# Patient Record
Sex: FEMALE | Race: WHITE
[De-identification: names, ages, dates, MRNs, and addresses within clinical notes are randomized per-mention and may not be internally consistent; named-entity substitution may affect disease eponyms.]

---

## 2019-03-25 ENCOUNTER — HOSPITAL ENCOUNTER (EMERGENCY)
Dept: HOSPITAL 88 - FSED | Age: 24
Discharge: LEFT BEFORE DISCHARGE | End: 2019-03-25
Payer: SELF-PAY

## 2019-03-25 DIAGNOSIS — R11.10: Primary | ICD-10-CM

## 2019-03-25 NOTE — XMS REPORT
Encounter Summary

                             Created on: 2017



Marisela Guerra

External Reference #: [id number]

: 1995

Sex: Female



Demographics







                          Address                   [address] [place] [address] [place]  [address]

 

                          Home Phone                [phone]

 

                          Preferred Language        Unknown

 

                          Marital Status            Never 

 

                          Jewish Affiliation     Unknown

 

                          Race                      Unknown

 

                          Ethnic Group              Unknown





Author







                          Organization              Unknown

 

                          Address                   [address] [place] [address]  [id number]



 

                          Phone                     [phone]







Care Team Providers







                    Care Team Member Name    Role                Phone

 

                    Misael Holder      3                   [phone]



                                                      



Reason for Visit

                      





                                        Medical Complaint                



                                                                              



Instructions

          





                                        1. Pain in throat

 

                                         sore throat: care instructions

 

                                         Lidocaine Viscous 2 % mucosal solution

 

                                         rapid strep group A, throat

 

                                        2. Influenza-like symptoms

 

                                         rapid flu (A+B)

 

                                        3. Headache

 

                                         headache: care instructions









                                        Discussion Note

 

                                        Pt is in NAD; Verbalizes understanding of all instructions with no questions at 

this time.



                                                                                
                 



Plan of Care

          





Patient Instructions





                                            

Gargle and spit viscous lidocaine as needed for sore throat as directed. 
Alternate with Ibuprofen and acetaminophen every 4hrs as needed for 
pain/fever/headache. Proper hydration and rest.    

    

Return to work/school if free of fever for 24-hrs. Do not share any 
utensils/cups, no kissing, recommend hand washing after 
coughing/sneezing/blowing nose and cover face when you do so    

    

Take medications as prescribed. Return to clinic or follow up with your PCP 
within 2-3 days if symptoms worsen as discussed. 

    













                Reminders                                       Provider

 

                Appointments    None recorded.                   

 

                Lab             Rapid Strep Group a, Throat    2017      [facility]

 

                               Rapid Flu (A+B)    2017      [facility]

 

                Referral        None recorded.                   

 

                Procedures      None recorded.                   

 

                Surgeries       None recorded.                   

 

                Imaging         None recorded.                   



                                                                                
                 



Medications

                      





                    Name                      Start Date                                      

 

                                        EpiPen 2-Julian 0.3 mg/0.3 mL injection, auto-injector

 USE AS DIRECTED FOR SEVERE ALLERGIC REACTION.    

 

                                        Lidocaine Viscous 2 % mucosal solution

 Take 10 mL every 3 hours by oral route as needed.    

 

                                        midodrine 5 mg tablet

 TAKE ONE (1) TABLET(S) BY MOUTH THREE TIMES A DAY.    

 

                          ProAir HFA 90 mcg/actuation aerosol inhaler    



                                                                                
                                     



Medications Administered

          



  None recorded.                                                                
               



Vitals

          





                Height          Weight          BMI             Blood Pressure 

 

                 5 ft 5 in        140 lbs         23.3 kg/m2        110/72 mm[Hg]  



                                                                              



Lab Results

                      





                Date                      Name                      Specimen                      Result

                          Interpretation                      Description                

                Value                      Range                      Status                      Address

                                                        

 

             Rapid Flu (A+B)                         Influenza a     negative                [facility]: [facility] [place]

 

                                        Influenza B     negative                [facility]: [facility] [place]



 

             Rapid Strep Group a, Throat                         Result     negative                [facility]: 

[facility] [place]

 

                                        Swab Location     Left and Right tonsillar pillars                [facility]:

 [facility] [place]



                                                                                
                  



Allergies

          





          Code      Code System    Name      Reaction    Severity    Status    Onset

 

          225004    RxNorm    Lexapro    Hives              Active    

 

                                     Rash               Active    



                                                                              



Problems

                      





                Name                      Status                      Onset Date                      

Source                                                  

 

                Common Cold     Active                         Encounter

 

                Nasopharyngitis    Active                         Encounter

 

                Acute Sinusitis    Active                         Encounter

 

                Acute Pharyngitis    Active                         Encounter

 

                Upper Respiratory Infection    Active                         Encounter

 

                Aphthous Ulceration of Skin And/or Mucous Membrane    Active                         Encounter

 

                Acute Cystitis    Active                         Encounter

 

                Urinary Tract Infectious Disease    Active                         Encounter

 

                Skin Lesion     Active                         Encounter

 

                Influenza-like Symptoms    Active                         Encounter



                                                                                
                                                                                
                  



Procedures

                      





                Date                      Name                      Performed by                      

                

 

                    2014          Endometrial Cryoablation    Information not available

 

                                       Removal of Tonsils    Information not available



                                                                                
                  



Vaccine List

          





                                        Vaccine Type

 

                                        meningococcal MCV4P

 

                                        2010

 

                                        Tdap

 

                                        2010



                                                                                
        



Social History

          





                    Smoking Status      Never Smoker         



                                                                              



Past Encounters

                      





                                        2017

Pain in Throat; Influenza-like Symptoms; Headache

[facility] [address] [place] [address], Ph. [phone]



                                                                                
        



History of Present Illness

          





                                                   Illness-Fever-Flu

 

                          Reported By:              Patient

 

                          HPI:                      Quality: symptoms worse during the day. Duration: 3 days. Severity: subjective

 temperature. Context: no ill contacts, no tick/insect bites, no recent travel, 
no new medications. Associated Symptoms: no muscle aches, no rash, no lethargy, 
fever/chills, headache; sore throat. Modifying Factors nothing gives relief







Review of Systems:ROS as noted in the HPI                                       
                             



Review of Systems

                      





                                                   Basic

 

                          Reported By:              Patient



                                                                              



Physical Exam

                      





                                                   Adult Basic, Adult Female Complete, Adult Male Complete

 

                          Reported By:              Patient

 

                          Constitutional:           General Appearance: healthy-appearing, well-nourished, well-developed.

 Level of Distress: NAD. Ambulation: ambulating normally

 

                          Psychiatric:              Mental Status: active and alert. Orientation: to time, to place, to

 person

 

                          Eyes:                     Lids and Conjunctivae: non-injected, no discharge, no pallor. Pupils: PERRLA.

 Corneas: grossly intact. EOM: EOMI. Lens: clear. Vision: peripheral vision 
grossly intact

 

                          Ear-Nose-Mouth-Throat:    Ears: no lesions on external ear, no outer ear tenderness,

 EACs clear, TMs clear. Hearing: no hearing loss. Nose: no lesions on external 
nose, nares patent, no septal deviation, nasal passages clear, no sinus 
tenderness, nasal discharge--rhinorrhea, post nasal drip. Lips, Teeth, and Gums:
 no mouth or lip ulcers, no bleeding gums, normal dentition. Oropharynx: moist 
mucous membranes, no erythema, no exudates, tonsils absent

 

                          Neck:                     Neck: supple. Lymph Nodes: no cervical LAD

 

                          Lungs:                    Respiratory effort: no dyspnea, no tachypnea, no use of accessory muscles,

 no intercostal retractions. Auscultation: breath sounds normal, good air 
movement

 

                          Cardiovascular:           Heart Auscultation: RRR, no murmurs

 

                          Neurologic:               Gait and Station: normal gait, normal station. Cranial Nerves: grossly

 intact. Sensation: grossly intact. Reflexes: DTRs 2+ bilaterally throughout. 
Coordination and Cerebellum: no tremor

## 2019-03-25 NOTE — XMS REPORT
Continuity of Care Document

                             Created on: 2017



MICHELLE ROMERO

External Reference #: [id number]

: 1995

Sex: Female



Demographics







                          Address                   [address] [place] [address]  [id number]

 

                          Home Phone                [phone]

 

                          Preferred Language        Unknown

 

                          Marital Status            Unknown

 

                          [place] Affiliation     Unknown

 

                          Race                      Unknown

 

                          Ethnic Group              Unknown





Author







                          Author                    [facility]              Interface

 

                          Address                   Unknown

 

                          Phone                     Unavailable



                                                    



Problems

                    





                    Problem                            Status                            Onset Date     

                          Classification                            Date Reported       

                          Comments                            Source                    

 

                    Pain in throat                                                        2017    

                          Diagnosis                            2017              

                                                      [facility]                    

 

                    Influenza-like symptoms                                                        2017

                            Diagnosis                            2017          

                                                      [facility]                    

 

                    Headache                                                        2017          

                          Diagnosis                            2017                    

                                                      [facility]                    

 

                    FOOT                            Active                            2014        

                                                                                       

                                        [facility]                    

 

                          Discharge Diagnosis: Acute leg pain                                               

                    2014  

                                                      [facility]                   

 

 

                          789.01, 787.01, VOMITTING                            Active                       

                    2013                                                                        

                                                      [facility]                    

 

                    Common Cold                                                                         

                    Problem                            2017                               

                                        [facility]                    

 

                    Nasopharyngitis                                                                     

                          Problem                            2017                         

                                                      [facility]                    

 

                    Acute Sinusitis                                                                     

                          Problem                            2017                         

                                                      [facility]                    

 

                    Acute Pharyngitis                                                                   

                          Problem                            2017                       

                                                      [facility]                    

 

                    Upper Respiratory Infection                                                         

                           Problem                            2017             

                                                      [facility]                    

 

                          Aphthous Ulceration of Skin And/or Mucous Membrane                                

                                                    Problem                            

2017                                                        [facility]         

           

 

                    Acute Cystitis                                                                      

                    Problem                            2017                            

                                        [facility]                    

 

                          Urinary Tract Infectious Disease                                                  

                                                Problem                            2017        

                                                      [facility]                    

 

                    Skin Lesion                                                                         

                    Problem                            2017                               

                                        [facility]                    

 

                    Influenza-like Symptoms                                                             

                          Problem                            2017                 

                                                      [facility]                    



                                                                                
                                                                                
                                                                                
                                                                                
    



Medications

                    





                    Medication                            Details                            Route      

                          Status                            Patient Instructions         

                          Ordering Provider                            Order Date           

                                        Source                    

 

                                        Acetaminophen 325 MG / Hydrocodone Bitartrate 5 MG Oral Tablet [Norco 5/325]    

                                        1 tab, Route: PO, Dosing Weight 50, kg, ONCE, Start date: 14

 17:45:00, Stop date: 14 17:45:00                                            

                    Inactive                                                                   

                          2014                            [facility]                  

  

 

                          Ibuprofen                            600 mg, Route: PO, Drug form: TAB, ONCE, Dosing

 Weight 50, kg, Priority: STAT, Start date: 14 17:44:00, Stop date: 
14 17:44:00                                                        Inactive    

                                                                                2014

                                        [facility]                    

 

                                        [id number] 0.3 ML Epinephrine 1 MG/ML Auto-Injector [Epipen]                     

                                        EpiPen 2-Julian 0.3 mg/0.3 mL injection, auto-injector USE AS DIRECTED FOR SEVERE

 ALLERGIC REACTION.                                                        Active    

                                                                                       

                                        [facility]                    

 

                                        Lidocaine Hydrochloride 20 MG/ML Mucous Membrane Topical Solution               

                                        Lidocaine Viscous 2 % mucosal solution Take 10 mL every 3 hours by oral

 route as needed.                                                        Active      

                                                                                       

                                        [facility]                    

 

                          midodrine hydrochloride 5 MG Oral Tablet                            midodrine 5 mg

 tablet TAKE ONE (1) TABLET(S) BY MOUTH THREE TIMES A DAY.                      
                                                Active                                               

                                                                            [facility]            

        

 

                                        200 ACTUAT Albuterol 0.09 MG/ACTUAT Metered Dose Inhaler [ProAir]               

                          ProAir HFA 90 mcg/actuation aerosol inhaler                             

                           Active                                                    

                                                                            [facility]                 

   



                                                                                
                                                                                
        



Allergies, Adverse Reactions, Alerts

                    





                    Substance                            Category                            Reaction   

                          Severity                            Reaction type           

                          Status                            Date Reported                     

                          Comments                            Source                    

 

                    Lexapro                            Assertion                                        

                                                Drug allergy                            Active

                                                                                    [facility]                    



                                                                        



Immunizations

                    





                    Immunization                            Date Given                            Site  

                          Status                            Last Updated             

                          Comments                            Source                    

 

                          meningococcal MCV4P                            2010                         

                                                completed                                               

                                                      [facility]                    

 

                    Tdap                            2010                                          

                    completed                                                                

                                        [facility]                    



                                                                                
                        



Results

                    





                    Order Name                            Results                            Value      

                          Reference Range                            Date                

                          Interpretation                            Comments                       

                                        Source                    

 

                                                Influenza A                            negative         

                                                 2017                            

                                                        [facility]                   

 

 

                                                Influenza B                            negative         

                                                 2017                            

                                                        [facility]                   

 

 

                                                RESULT                            negative              

                                                2017                                 

                                                      [facility]                    

 

                                                SWAB LOCATION                            Left and Right 

tonsillar pillars                                                          2017

                                                                                    [facility]

                    

 

                    CHEM PANEL                            A/G Ratio                            1.3      

                          0.7 - 1.6                            2014              

                                                                            [facility]     

               

 

                    CHEM PANEL                            B/C Ratio                            11       

                          6 - 25                            2014                  

                                                                            [facility]         

           

 

                    CHEM PANEL                            AGAP                            0.9 meq/L     

                          10.0 - 20.0                            2014            

                                                                            [facility]   

                 

 

                    CHEM PANEL                            Bili Total                            0.7 mg/dL

                             0.2 - 1.3                            2014         

                                                                            [facility]

                    

 

                    CHEM PANEL                            Alk Phos                            67 unit/L 

                            39 - 136                            2014           

                                                                            [facility]  

                  

 

                    CHEM PANEL                            Globulin                            2.9 g/dL  

                           2.0 - 4.0                            2014           

                                                                            [facility]  

                  

 

                    CHEM PANEL                            eGFR                            106 mL/min/1.73m2

                                                         2014                  

                                                      1Result Comment: The eGFR is calculated using the

 CKD-EPI formula. In most young, healthy individuals the eGFR will be >90 
mL/min/1.73m2. The eGFR declines with age. An eGFR of 60-89 may be normal in 
some populations, particularly the elderly, for whom the CKD-EPI formula has not
 been extensively validated. Use of the eGFR is not recommended in the following
 populations:



Individuals with unstable creatinine concentrations, including pregnant patients
 and those with serious co-morbid conditions.



Patients with extremes in muscle mass or diet. 



The data above are obtained from the National Kidney Disease Education Program 
(NKDEP) which additionally recommends that when the eGFR is used in patients 
with extremes of body mass index for purposes of drug dosing, the eGFR should be
 multiplied by the estimated BMI.                            [facility]          

          

 

                    CHEM PANEL                            Albumin Lvl                            3.8 g/dL

                             3.5 - 5.0                            2014         

                                                                            [facility]

                    

 

                    CHEM PANEL                            AST                            20 unit/L      

                          0 - 37                            2014                  

                                                                            [facility]         

           

 

                    CHEM PANEL                            ALT                            15 unit/L      

                          0 - 65                            2014                  

                                                                            [facility]         

           

 

                    CHEM PANEL                            Total Protein                            6.7 g/dL

                             6.4 - 8.4                            2014         

                                                                            [facility]

                    

 

                    CHEM PANEL                            CO2                            37 meq/L       

                          24 - 32                            2014                  

                                                                            [facility]         

           

 

                    CHEM PANEL                            Calcium Lvl                            8.6 mg/dL

                             8.5 - 10.5                            2014        

                                                                            [facility]

                    

 

                    CHEM PANEL                            Potassium Lvl                            3.9 meq/L

                             3.5 - 5.1                            2014         

                                                                            [facility]

                    

 

                    CHEM PANEL                            Chloride Lvl                            107 meq/L

                             95 - 109                            2014          

                                                                            [facility] 

                   

 

                    CHEM PANEL                            Sodium Lvl                            141 meq/L

                             135 - 145                            2014         

                                                                            [facility]

                    

 

                    CHEM PANEL                            Glucose Lvl                            92 mg/dL

                             70 - 99                            2014           

                                                      2Interpretive Data: Adult reference range

 values reflect the clinical guidelines

of the American Diabetes Association.                            [facility]      

              

 

                    CHEM PANEL                            BUN                            9 mg/dL        

                          7 - 22                            2014                    

                                                                            [facility]           

         

 

                    CHEM PANEL                            Creatinine Lvl                            0.8 

mg/dL                             0.5 - 1.4                            2014    

                                                                                [facility]

                    

 

                          Ext Lower Venous Doppler Unilat US                            Ext Lower Venous Doppler

 Unilat US                              EXAM: Left lower extremity venous Doppler ultrasound

 and SPECTRAL analysis.

 

CLINICAL INFORMATION: Pain. Leg pain. 

 

TECHNIQUE: Duplex and color Doppler evaluation of the deep venous system of left
 leg.

 

FINDINGS:  

 

The left common femoral, femoral, popliteal and tibial veins demonstrate normal 
color-flow, compressibility and augmentation.

 

The left greater saphenous vein is also patent. 

 

IMPRESSION:

 

No sonographic evidence of deep venous thrombosis in the left leg.  

 

 

 

SL: 12

                                                          2014                 

                                                      -

                                        -





Read by:  Los Parker MD

Dictated Date/time:  14 18:50

Electronically Signed by:  Los Parker MD                    14 
18:50

FINAL REPORT

                                        [facility]                    

 

                    CHEMISTRY                            U Preg                            Negative 

                          (2013 14:53:00)                                Negative                     

                    2013                            Normal                                    

                                        [facility]                    

 

                          Abdomen/Pelvis w/wo contrast CT                            Abdomen/Pelvis w/wo contrast

 CT                                     CT abdomen without and with contrast, CT pelvis without

 and with contrast.

 

TECHNIQUE: Contiguous transaxial images of the abdomen and pelvis were performed
 without and with IV contrast.  Oral contrast was administered.

 

COMPARISON: No priors

 

CT ABDOMEN:

 

The precontrast images do not reveal any renal or distal ureteric calculi. There
 is no hydronephrosis. Few surgical clips are noted in the right lower abdomen.

 

The postcontrast images reveal normal morphology and enhancement of the liver, 
spleen, pancreas, both kidneys, both adrenals and the gallbladder. The bowel gas
 pattern is within normal limits.  There is no free fluid or free air in the 
abdomen. No significant retroperitoneal lymphadenopathy is noted. The lung bases
 are clear. 

 

CT PELVIS:

 

The bladder demonstrates normal morphology on the pre and post contrast images. 
Uterus and adnexa demonstrate normal morphology. No significant bony abnormality
 is noted.

 

IMPRESSION: No significant abnormality is noted on the pre- and postcontrast CT 
of the abdomen and pelvis

                                                          2013                 

                                                      -

                                        -





Read by:  Anthony Wu

Dictated Date/time:  13 08:37

Electronically Signed by:  Anthony Wu MD         13 
08:44

FINAL REPORT

                                        [facility]                    

 

                          Gallbladder scan HIDA with meds (NM)                            Gallbladder scan HIDA

 with meds (NM)                            HIDA Scan with Kinevac:

 

TECHNIQUE: 6 mCi of [id number] were administered intravenously and images 
obtained in anterior projection. 

 

FINDINGS:  The liver demonstrates homogeneous tracer activity. The gallbladder 
and small bowel are well visualized 20 minutes postinjection. This eliminates 
any significant cystic duct or common bile duct obstruction. 

 

After acquisition of the data for approximately 60 minutes, 2.0 mcg of Kinevac 
were administered intravenously over a period of thirty minutes and an excretion
 curve plotted. The slope of the curve is normal and ejection fraction is 
calculated at 52 % (Normal range >50%). 

 

IMPRESSION: Normal HIDA scan with Kinevac.

                                                          2013                 

                                                      -

                                        -





Read by:  Anthony Wu

Dictated Date/time:  13 16:00

Electronically Signed by:  Anthony Wu MD         13 
16:02

FINAL REPORT

                                        [facility]                    



                                                                                
                                                                                
                                                                                
                                                                                
                                                                                
                                                                                
                                



Vital Signs

                    





                    Vital Sign                            Value                            Date         

                          Comments                            Source                    

 

                    Diastolic (mm Hg)                            72                             2017

                                                        [facility]                   

 

 

                    Height                            65                             2017         

                                                      [facility]                    

 

                    Systolic (mm Hg)                            110                             2017

                                                        [facility]                   

 

 

                    Weight                            140                             2017        

                                                      [facility]                    

 

                    Systolic (mm Hg)                            114                             2014

                                                        [facility]                 

   

 

                    Diastolic (mm Hg)                            70                             2014

                                                        [facility]                 

   

 

                    Respitory Rate                            18                             2014 

                                                       [facility]                  

  

 

                    Heart Rate                            88                             2014     

                                                      [facility]                    

 

                    Temperature Oral (F)                            98.0 F                            2014

                                                        [facility]                 

   

 

                    Weight                            50                             2014         

                                                      [facility]                    

 

                    BMI Calculated                            18.92                             2014

                                                        [facility]                 

   

 

                    Height                            162.56 cm                            2014   

                                                      [facility]                    



 

                    Systolic (mm Hg)                            122                             2014

                                                        [facility]                 

   

 

                    Temperature Oral (F)                            99.2 F                            2014

                                                        [facility]                 

   

 

                    Respitory Rate                            16                             2014 

                                                       [facility]                  

  

 

                    Heart Rate                            104                             2014    

                                                      [facility]                    

 

                    Diastolic (mm Hg)                            81                             2014

                                                        [facility]                 

   



                                                                                
                                                                                
                                                                                
                                                                                
                        



Encounters

                    





                    Location                            Location Details                            Encounter

 Type                            Encounter Number                            Reason For

 Visit                            Attending Provider                            ADM Date

                            DC Date                            Status                

                                        Source                    

 

                    [facility]                                                        Outpatient      

                          [id number]                            789.01, 787.01, VOMITTING

                            DORA ALVAREZ                             2013  

                                                      Active                         

                                        [facility]                            [id number]                 

                                                Khushbu Tillman                             2014                                               

                                        [facility]                    

 

                          [place] - [facility] [id number] [facility]: [address] [place] [address], Ph. [phone]                               [id number]          

                                                      Camille Casas                          

                    2017                                                                          

                                        [facility]                    



                                                                                
                            



Procedures

                    





                    Procedure                            Code                            Date           

                          Perfomer                            Comments                        

                                        Source                    

 

                          Endometrial Cryoablation                            86797                         

                    2014                                                                          

                                        [facility]                    

 

                    Removal of Tonsils                            02224                                 

                                                                               [facility]

## 2019-03-25 NOTE — XMS REPORT
Summary of Care: 14 - 14

                             Created on: 2070



MICHELLE ROMERO

External Reference #: [id number]

: 1995

Sex: Female



Demographics







                          Address                   [address] [place] [address] [place]  [address]-

 

                          Home Phone                [phone]

 

                          Preferred Language        English

 

                          Marital Status            Single

 

                          Jain Affiliation     Quaker

 

                          Race                      White/

 

                          Ethnic Group              Non-





Author







                          Organization              Unknown

 

                          Address                   Unknown

 

                          Phone                     Unavailable







Encounter





EVERETT Aparicio(REBA) [id number] Date(s): 14 - 14

[facility] [address] [address]

Discharge Diagnosis: Acute leg pain

Discharge Disposition: Home

Physician Attending: Khushbu Castillo MD





Reason for Visit





FOOT



Vital Signs







  



                     Most recent to      1                   2



                                         oldest [Reference  



                                         Range]:  

 

  



                           Height                    162.56 cm 



                                         (14 4:52 PM) 

 

  



                     Temperature Oral     98.0 DegF           99.2 DegF



                     [96.4-99.1 DegF]     (14 8:41 PM)     *HI*



                                         (14 4:52 PM)

 

  



                     Systolic Blood      114 mmHg            122 mmHg



                     Pressure [     (14 8:41 PM)     (14 4:52 PM)



                                         mmHg]  

 

  



                     Diastolic Blood     70 mmHg             81 mmHg



                     Pressure [60-90     (14 8:41 PM)     (14 4:52 PM)



                                         mmHg]  

 

  



                     Respiratory Rate     18 BRMIN            16 BRMIN



                     [14-20 BRMIN]       (14 8:41 PM)     (14 4:52 PM)

 

  



                     Peripheral Pulse     88 bpm              104 bpm



                     Rate [ bpm]     (14 8:41 PM)     *HI*



                                         (14 4:52 PM)

 

  



                           Weight                    50 kg 



                                         (14 4:52 PM) 

 

  



                           Body Mass Index           18.92 m2 



                                         (14 4:52 PM) 







Problem List





No data available for this section



Allergies, Adverse Reactions, Alerts







   



                 Substance       Reaction        Severity        Status

 

   



                           Lexapro                   Active







Medications





ibuprofen

600 mg, Route: PO, Drug form: TAB, ONCE, Dosing Weight 50, kg, Priority: STAT, S
tart date: 14 17:44:00, Stop date: 14 17:44:00

Start Date: 14

Stop Date: 14

Status: Completed



Norco 5/325 oral tablet

1 tab, Route: PO, Dosing Weight 50, kg, ONCE, Start date: 14 17:45:00, Sto
p date: 14 17:45:00

Start Date: 14

Stop Date: 14

Status: Completed



Results





ELECTROLYTES





 



                           Most recent to            1



                                         oldest [Reference 



                                         Range]: 

 

 



                           Sodium Lvl [135-145       141 mEq/L



                           mEq/L]                    (14 6:45 PM)

 

 



                           Potassium Lvl             3.9 mEq/L



                           [3.5-5.1 mEq/L]           (14 6:45 PM)

 

 



                           Chloride Lvl [      107 mEq/L



                           mEq/L]                    (14 6:45 PM)

 

 



                           CO2 [24-32 mEq/L]         37 mEq/L



                                         *HI*



                                         (14 6:45 PM)

 

 



                           AGAP [10.0-20.0           0.9 mEq/L



                           mEq/L]                    *LOW*



                                         (14 6:45 PM)







CHEM PANEL





 



                           Most recent to            1



                                         oldest [Reference 



                                         Range]: 

 

 



                           Creatinine Lvl            0.8 mg/dL



                           [0.5-1.4 mg/dL]           (14 6:45 PM)

 

 



                           eGFR                      106 mL/min/1.73m2 1



                                         *NA*



                                         (14 6:45 PM)

 

 



                           BUN [7-22 mg/dL]          9 mg/dL



                                         (14 6:45 PM)

 

 



                           B/C Ratio [6-25]          11



                                         (14 6:45 PM)

 

 



                           Glucose Lvl [70-99        92 mg/dL 2



                           mg/dL]                    (14 6:45 PM)

 

 



                           Total Protein             6.7 g/dL



                           [6.4-8.4 g/dL]            (14 6:45 PM)

 

 



                           Albumin Lvl [3.5-5.0      3.8 g/dL



                           g/dL]                     (14 6:45 PM)

 

 



                           Globulin [2.0-4.0         2.9 g/dL



                           g/dL]                     (14 6:45 PM)

 

 



                           A/G Ratio [0.7-1.6]       1.3



                                         (14 6:45 PM)

 

 



                           Calcium Lvl               8.6 mg/dL



                           [8.5-10.5 mg/dL]          (14 6:45 PM)

 

 



                           ALT [0-65 unit/L]         15 unit/L



                                         (14 6:45 PM)

 

 



                           AST [0-37 unit/L]         20 unit/L



                                         (14 6:45 PM)

 

 



                           Alk Phos [          67 unit/L



                           unit/L]                   (14 6:45 PM)

 

 



                           Bili Total [0.2-1.3       0.7 mg/dL



                           mg/dL]                    (14 6:45 PM)







1Result Comment: The eGFR is calculated using the CKD-EPI formula. In most 
young, healthy individuals the eGFR will be >90 mL/min/1.73m2. The eGFR declines
with age. An eGFR of 60-89 may be normal in some populations, particularly the 
elderly, for whom the CKD-EPI formula has not been extensively validated. Use of
the eGFR is not recommended in the following populations:



Individuals with unstable creatinine concentrations, including pregnant patients
and those with serious co-morbid conditions.



Patients with extremes in muscle mass or diet. 



The data above are obtained from the National Kidney Disease Education Program (
NKDEP) which additionally recommends that when the eGFR is used in patients with
extremes of body mass index for purposes of drug dosing, the eGFR should be mul
tiplied by the estimated BMI.



2Interpretive Data: Adult reference range values reflect the clinical guidelines

of the American Diabetes Association.



Medications Administered During Your Visit





No data available for this section



Immunizations





No data available for this section



Social History







 



                           Social History Type       Response

 

 



                           Smoking Status            Never smoker, Exposure to Tobacco Smoke None, Cigarette Smoking

 Last 365



                                         Days No, Reg Smoking Cessation Counseling No

## 2019-03-25 NOTE — XMS REPORT
Clinical Summary

                             Created on: 2019



Marisela Guerra

External Reference #: [id number]

: 1995

Sex: Female



Demographics







                          Address                   [address] [place]  [address]

 

                          Home Phone                [phone]

 

                          Preferred Language        English

 

                          Marital Status            Single

 

                          Yazidi Affiliation     Unknown

 

                          Race                      White

 

                          Ethnic Group              Non-





Author







                          Author                    [facility]

 

                          Organization              [facility]

 

                          Address                   Unknown

 

                          Phone                     Unavailable







Support







                Name            Relationship    Address         Phone

 

                Contact,No      [employer]            Unknown         [phone]







Care Team Providers







                    Care Team Member Name    Role                Phone

 

                    Asked, No Pcp       PCP                 Unavailable







Allergies







                                        Comments



                 Active Allergy     Reactions       Severity        Noted Date 

 

                                         



                           Escitalopram Oxalate       2017 







Medications







                          End Date                  Status



              Medication     Sig          Dispensed     Refills      Start  



                                         Date  

 

                                                    Active



                 midodrine (PROAMATINE) 5     TAKE ONE (1)      3                 



                     MG tablet           TABLET(S) BY        7  



                                         MOUTH THREE     



                                         TIMES A DAY.     







Active Problems





No known active problems



Family History







   



                 Medical History     Relation        Name            Comments

 

   



                           Cancer                    Maternal Aunt  

 

   



                           No Known Problems         Mother  









   



                 Relation        Name            Status          Comments

 

   



                                         Maternal Aunt   

 

   



                                         Mother   







Social History







                                        Date



                 Tobacco Use     Types           Packs/Day       Years Used 

 

                                         



                                         Never Smoker    









   



                 Alcohol Use     Drinks/Week     oz/Week         Comments

 

   



                                         No   









 



                           Sex Assigned at Birth     Date Recorded

 

 



                                         Not on file 









                                        Industry



                           Job Start Date            Occupation 

 

                                        Not on file



                           Not on file               Not on file 









                                        Travel End



                           Travel History            Travel Start 

 





                                         No recent travel history available.







Last Filed Vital Signs

Not on file



Plan of Treatment







   



                 Health Maintenance     Due Date        Last Done       Comments

 

   



                           CERVICAL CANCER SCREENING     2014  

 

   



                           INFLUENZA VACCINE         2018  







Results

Not on fileafter 2018



Insurance







     



            Payer      Benefit     Subscriber ID     Type       Phone      Address



                                         Plan /    



                                         Group    

 

     



                 [facility]      xxxxxxxxx       PPO  



                                         THCARE    



                                         COMMERCIAL    



                                         HMO/POS/PP    



                                         O    









     



            Guarantor Name     Account     Relation to     Date of     Phone      Billing Address



                     Type                Patient             Birth  

 

     



            Marisela Guerra     Personal/F     Self       1995     [phone]     [address]               (Home)              [place] [address]







Advance Directives





Patient has advance care planning documents on file. For more information, zafar perez contact:



Elder Rosario



[address] [place] [address]

## 2019-03-25 NOTE — XMS REPORT
Encounter CCD: 2013 to 2013

                             Created on: 2018



MICHELLE ROMERO

External Reference #: [id number]

: 1995

Sex: Female



Demographics







                          Address                   [address] [place] [address] [place]  [address]-

 

                          Home Phone                [phone]

 

                          Preferred Language        Unknown

 

                          Marital Status            Single

 

                          Hinduism Affiliation     Restorationist

 

                          Race                      White/

 

                          Ethnic Group              Non-





Author







                          Author                    [employer]

 

                          Organization              [facility]

 

                          Address                   Unknown

 

                          Phone                     Unavailable







Care Team Providers







                    Care Team Member Name    Role                Phone

 

                    Vickey Denise    RP                  [phone]







Allergies, Adverse Reactions, Alerts







  



                     Substance           Reaction            Status

 

  



                           Lexapro                   Active







Results





CHEMISTRY





                          Most recent to oldest [Reference Range]:    1

 

                          U Preg [Negative]         Negative 

                                        (2013 14:53:00)